# Patient Record
Sex: MALE | Race: WHITE | NOT HISPANIC OR LATINO | Employment: FULL TIME | URBAN - METROPOLITAN AREA
[De-identification: names, ages, dates, MRNs, and addresses within clinical notes are randomized per-mention and may not be internally consistent; named-entity substitution may affect disease eponyms.]

---

## 2023-08-25 PROBLEM — R06.83 SNORING: Status: ACTIVE | Noted: 2023-08-25

## 2023-08-25 PROBLEM — J34.89 NASAL OBSTRUCTION: Status: ACTIVE | Noted: 2023-08-25

## 2023-08-25 PROBLEM — J34.89 LESION OR MASS OF PARANASAL SINUSES: Status: ACTIVE | Noted: 2023-08-25

## 2023-08-25 PROBLEM — E66.01 MORBID OBESITY (MULTI): Status: ACTIVE | Noted: 2023-08-25

## 2023-08-25 PROBLEM — G47.33 OBSTRUCTIVE SLEEP APNEA, ADULT: Status: ACTIVE | Noted: 2023-08-25

## 2023-08-25 PROBLEM — J32.0 CHRONIC RIGHT MAXILLARY SINUSITIS: Status: ACTIVE | Noted: 2023-08-25

## 2023-08-25 PROBLEM — R05.8 PRODUCTIVE COUGH: Status: ACTIVE | Noted: 2023-08-25

## 2023-08-25 PROBLEM — J34.89 NASAL CRUSTING: Status: ACTIVE | Noted: 2023-08-25

## 2023-08-25 PROBLEM — R05.3 COUGH, PERSISTENT: Status: ACTIVE | Noted: 2023-08-25

## 2023-08-25 PROBLEM — J34.2 NASAL SEPTAL DEVIATION: Status: ACTIVE | Noted: 2023-08-25

## 2023-08-25 PROBLEM — Z04.9 CONDITION NOT FOUND: Status: ACTIVE | Noted: 2023-08-25

## 2023-08-25 PROBLEM — J34.3 HYPERTROPHY OF BOTH INFERIOR NASAL TURBINATES: Status: ACTIVE | Noted: 2023-08-25

## 2023-08-25 PROBLEM — J31.0 CHRONIC RHINITIS: Status: ACTIVE | Noted: 2023-08-25

## 2023-08-25 RX ORDER — PHENYLPROPANOLAMINE/CLEMASTINE 75-1.34MG
TABLET, EXTENDED RELEASE ORAL
COMMUNITY

## 2023-08-25 RX ORDER — SILDENAFIL 100 MG/1
100 TABLET, FILM COATED ORAL DAILY PRN
COMMUNITY
Start: 2023-02-23

## 2023-10-03 ENCOUNTER — APPOINTMENT (OUTPATIENT)
Dept: NUTRITION | Facility: HOSPITAL | Age: 58
End: 2023-10-03
Payer: COMMERCIAL

## 2023-10-31 ENCOUNTER — OFFICE VISIT (OUTPATIENT)
Dept: PRIMARY CARE | Facility: CLINIC | Age: 58
End: 2023-10-31
Payer: COMMERCIAL

## 2023-10-31 VITALS — DIASTOLIC BLOOD PRESSURE: 92 MMHG | SYSTOLIC BLOOD PRESSURE: 126 MMHG | BODY MASS INDEX: 43.81 KG/M2 | WEIGHT: 315 LBS

## 2023-10-31 DIAGNOSIS — Z00.00 HEALTHCARE MAINTENANCE: Primary | ICD-10-CM

## 2023-10-31 DIAGNOSIS — I10 HYPERTENSION, UNSPECIFIED TYPE: ICD-10-CM

## 2023-10-31 PROCEDURE — 1036F TOBACCO NON-USER: CPT | Performed by: STUDENT IN AN ORGANIZED HEALTH CARE EDUCATION/TRAINING PROGRAM

## 2023-10-31 PROCEDURE — 3080F DIAST BP >= 90 MM HG: CPT | Performed by: STUDENT IN AN ORGANIZED HEALTH CARE EDUCATION/TRAINING PROGRAM

## 2023-10-31 PROCEDURE — 3074F SYST BP LT 130 MM HG: CPT | Performed by: STUDENT IN AN ORGANIZED HEALTH CARE EDUCATION/TRAINING PROGRAM

## 2023-10-31 PROCEDURE — 3008F BODY MASS INDEX DOCD: CPT | Performed by: STUDENT IN AN ORGANIZED HEALTH CARE EDUCATION/TRAINING PROGRAM

## 2023-10-31 PROCEDURE — 99386 PREV VISIT NEW AGE 40-64: CPT | Performed by: STUDENT IN AN ORGANIZED HEALTH CARE EDUCATION/TRAINING PROGRAM

## 2023-10-31 RX ORDER — VITAMIN A 3000 MCG
CAPSULE ORAL
COMMUNITY
Start: 2022-11-29

## 2023-10-31 RX ORDER — CALCIUM CARBONATE/VITAMIN D2 250 MG-125
TABLET ORAL
COMMUNITY
Start: 2022-11-29 | End: 2024-01-31

## 2023-10-31 RX ORDER — ENALAPRIL MALEATE 10 MG/1
10 TABLET ORAL
COMMUNITY
Start: 2023-09-25 | End: 2023-10-31 | Stop reason: ALTCHOICE

## 2023-10-31 RX ORDER — TRIAMCINOLONE ACETONIDE 55 UG/1
SPRAY, METERED NASAL
COMMUNITY
Start: 2022-12-06

## 2023-10-31 RX ORDER — TRAMADOL HYDROCHLORIDE 50 MG/1
TABLET ORAL
COMMUNITY
Start: 2022-11-29 | End: 2023-10-31 | Stop reason: ALTCHOICE

## 2023-10-31 RX ORDER — ENALAPRIL MALEATE 20 MG/1
20 TABLET ORAL DAILY
Qty: 30 TABLET | Refills: 11 | Status: SHIPPED | OUTPATIENT
Start: 2023-10-31 | End: 2024-01-31 | Stop reason: SDUPTHER

## 2023-10-31 RX ORDER — ONDANSETRON 4 MG/1
TABLET, ORALLY DISINTEGRATING ORAL
COMMUNITY
Start: 2022-11-29 | End: 2023-10-31 | Stop reason: ALTCHOICE

## 2023-10-31 NOTE — PROGRESS NOTES
Establish as a new patient and concerned about high blood pressure and weight    Subjective   Patient ID: Luther Zimmerman is a 57 y.o. male who presents for Establish Care, Hypertension, and conerned about weight.    HPI     Presents to [facility] care    Past medical history: Hypertension, erectile dysfunction, SULMA on CPAP  Past surgical history: [place] teeth, rotator cuff, nasal surgery  No known drug allergies  Social history: Heavy , , 3 kids aged 15 and 28  Family history: Mother with breast cancer    Knee injury at work about 1 year ago, has follow-up with orthopedics and had the knee drained and has received steroid injection.  Still has significantly limited his activities.    Would like to talk about weight loss strategies.  Had seen a nutritionist previously    Diastolic blood pressure at home is always in the 90s to 100    Review of Systems    8 point review of systems is otherwise negative unless mentioned on HPI      Objective   BP (!) 126/92   Wt 147 kg (323 lb)   BMI 43.81 kg/m²     Physical Exam    General: No acute distress  HEENT: EOMI  CV: Regular rate and rhythm, normal S1 and S2, no murmurs  Pulm: Clear to auscultation bilaterally, no wheezings, rales or rhonchi  Abd: Nondistended  MSK: 5/5 strength in all extremities  Skin: No rashes   Lymphatic: No lymphadenopathy      Assessment/Plan       BMI 43  -Discussed different weight loss strategies.  Encouraged activities that would allow with his right knee injury, discussed recumbent bike, elliptical or rowing.  When has been in the gym and warm and focusing on strengthening exercises and heavy weights  -Discussed different dietary modifications including focusing on low carbohydrate diet  -Start Ozempic 0.25 mg weekly, discussed common side effects    Hypertension  -Increase enalapril from 10 mg daily to 20 mg daily    SULMA  -On CPAP    Erectile dysfunction  -Viagra as needed    Healthcare maintenance  -Routine labs    RTC 2  months    This note was dictated by speech recognition. Minor errors in transcription may be present.

## 2023-11-02 ENCOUNTER — LAB (OUTPATIENT)
Dept: LAB | Facility: LAB | Age: 58
End: 2023-11-02
Payer: COMMERCIAL

## 2023-11-02 ENCOUNTER — TELEPHONE (OUTPATIENT)
Dept: PRIMARY CARE | Facility: CLINIC | Age: 58
End: 2023-11-02

## 2023-11-02 DIAGNOSIS — Z00.00 HEALTHCARE MAINTENANCE: ICD-10-CM

## 2023-11-02 LAB
ALBUMIN SERPL BCP-MCNC: 4.6 G/DL (ref 3.4–5)
ALP SERPL-CCNC: 62 U/L (ref 33–120)
ALT SERPL W P-5'-P-CCNC: 22 U/L (ref 10–52)
ANION GAP SERPL CALC-SCNC: 16 MMOL/L (ref 10–20)
AST SERPL W P-5'-P-CCNC: 15 U/L (ref 9–39)
BILIRUB SERPL-MCNC: 0.5 MG/DL (ref 0–1.2)
BUN SERPL-MCNC: 16 MG/DL (ref 6–23)
CALCIUM SERPL-MCNC: 9.5 MG/DL (ref 8.6–10.6)
CHLORIDE SERPL-SCNC: 102 MMOL/L (ref 98–107)
CHOLEST SERPL-MCNC: 164 MG/DL (ref 0–199)
CHOLESTEROL/HDL RATIO: 3.3
CO2 SERPL-SCNC: 26 MMOL/L (ref 21–32)
CREAT SERPL-MCNC: 1.05 MG/DL (ref 0.5–1.3)
ERYTHROCYTE [DISTWIDTH] IN BLOOD BY AUTOMATED COUNT: 14.2 % (ref 11.5–14.5)
EST. AVERAGE GLUCOSE BLD GHB EST-MCNC: 114 MG/DL
GFR SERPL CREATININE-BSD FRML MDRD: 83 ML/MIN/1.73M*2
GLUCOSE SERPL-MCNC: 88 MG/DL (ref 74–99)
HBA1C MFR BLD: 5.6 %
HCT VFR BLD AUTO: 50.1 % (ref 41–52)
HDLC SERPL-MCNC: 49.1 MG/DL
HGB BLD-MCNC: 16 G/DL (ref 13.5–17.5)
INSULIN P FAST SERPL-ACNC: 15 UIU/ML (ref 3–25)
LDLC SERPL CALC-MCNC: 103 MG/DL
MCH RBC QN AUTO: 29.9 PG (ref 26–34)
MCHC RBC AUTO-ENTMCNC: 31.9 G/DL (ref 32–36)
MCV RBC AUTO: 94 FL (ref 80–100)
NON HDL CHOLESTEROL: 115 MG/DL (ref 0–149)
NRBC BLD-RTO: 0 /100 WBCS (ref 0–0)
PLATELET # BLD AUTO: 354 X10*3/UL (ref 150–450)
POTASSIUM SERPL-SCNC: 4.5 MMOL/L (ref 3.5–5.3)
PROT SERPL-MCNC: 7.4 G/DL (ref 6.4–8.2)
PSA SERPL-MCNC: 2.46 NG/ML
RBC # BLD AUTO: 5.35 X10*6/UL (ref 4.5–5.9)
SODIUM SERPL-SCNC: 139 MMOL/L (ref 136–145)
TRIGL SERPL-MCNC: 62 MG/DL (ref 0–149)
TSH SERPL-ACNC: 0.48 MIU/L (ref 0.44–3.98)
VLDL: 12 MG/DL (ref 0–40)
WBC # BLD AUTO: 10.1 X10*3/UL (ref 4.4–11.3)

## 2023-11-02 PROCEDURE — 83525 ASSAY OF INSULIN: CPT

## 2023-11-02 PROCEDURE — 84443 ASSAY THYROID STIM HORMONE: CPT

## 2023-11-02 PROCEDURE — 80053 COMPREHEN METABOLIC PANEL: CPT

## 2023-11-02 PROCEDURE — 80061 LIPID PANEL: CPT

## 2023-11-02 PROCEDURE — 84153 ASSAY OF PSA TOTAL: CPT

## 2023-11-02 PROCEDURE — 85027 COMPLETE CBC AUTOMATED: CPT

## 2023-11-02 PROCEDURE — 83036 HEMOGLOBIN GLYCOSYLATED A1C: CPT

## 2023-11-02 PROCEDURE — 36415 COLL VENOUS BLD VENIPUNCTURE: CPT

## 2023-11-02 NOTE — TELEPHONE ENCOUNTER
Patient was turned down for ozempic wants to know if you can prescribe wegovy.  I informed him it was out of stock until January.  Patient is getting lab work today and was hoping for results by tomorrow, Please call

## 2023-11-03 RX ORDER — SEMAGLUTIDE 0.25 MG/.5ML
0.25 INJECTION, SOLUTION SUBCUTANEOUS
Qty: 2 ML | Refills: 1 | Status: SHIPPED | OUTPATIENT
Start: 2023-11-03 | End: 2023-12-23

## 2024-01-05 ENCOUNTER — APPOINTMENT (OUTPATIENT)
Dept: PRIMARY CARE | Facility: CLINIC | Age: 59
End: 2024-01-05
Payer: COMMERCIAL

## 2024-01-29 ASSESSMENT — ENCOUNTER SYMPTOMS
PND: 0
SWEATS: 0
HEADACHES: 1
SHORTNESS OF BREATH: 1
PALPITATIONS: 1
HYPERTENSION: 1
BLURRED VISION: 0
NECK PAIN: 0
ORTHOPNEA: 0

## 2024-01-31 ENCOUNTER — TELEPHONE (OUTPATIENT)
Dept: PRIMARY CARE | Facility: CLINIC | Age: 59
End: 2024-01-31

## 2024-01-31 ENCOUNTER — OFFICE VISIT (OUTPATIENT)
Dept: PRIMARY CARE | Facility: CLINIC | Age: 59
End: 2024-01-31
Payer: COMMERCIAL

## 2024-01-31 VITALS — BODY MASS INDEX: 43.67 KG/M2 | WEIGHT: 315 LBS | DIASTOLIC BLOOD PRESSURE: 100 MMHG | SYSTOLIC BLOOD PRESSURE: 148 MMHG

## 2024-01-31 DIAGNOSIS — Z00.00 HEALTHCARE MAINTENANCE: Primary | ICD-10-CM

## 2024-01-31 DIAGNOSIS — I10 HYPERTENSION, UNSPECIFIED TYPE: ICD-10-CM

## 2024-01-31 PROBLEM — R05.3 COUGH, PERSISTENT: Status: RESOLVED | Noted: 2023-08-25 | Resolved: 2024-01-31

## 2024-01-31 PROBLEM — R41.89 COGNITIVE IMPAIRMENT: Status: ACTIVE | Noted: 2019-06-04

## 2024-01-31 PROBLEM — S83.92XA SPRAIN OF LEFT KNEE: Status: ACTIVE | Noted: 2023-07-11

## 2024-01-31 PROBLEM — S61.210A LACERATION OF RIGHT INDEX FINGER: Status: ACTIVE | Noted: 2023-02-28

## 2024-01-31 PROBLEM — S61.212A LACERATION OF RIGHT MIDDLE FINGER: Status: ACTIVE | Noted: 2023-02-28
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
## 2024-01-31 PROCEDURE — 99213 OFFICE O/P EST LOW 20 MIN: CPT | Performed by: STUDENT IN AN ORGANIZED HEALTH CARE EDUCATION/TRAINING PROGRAM

## 2024-01-31 PROCEDURE — 3008F BODY MASS INDEX DOCD: CPT | Performed by: STUDENT IN AN ORGANIZED HEALTH CARE EDUCATION/TRAINING PROGRAM

## 2024-01-31 PROCEDURE — 3080F DIAST BP >= 90 MM HG: CPT | Performed by: STUDENT IN AN ORGANIZED HEALTH CARE EDUCATION/TRAINING PROGRAM

## 2024-01-31 PROCEDURE — 1036F TOBACCO NON-USER: CPT | Performed by: STUDENT IN AN ORGANIZED HEALTH CARE EDUCATION/TRAINING PROGRAM

## 2024-01-31 PROCEDURE — 3077F SYST BP >= 140 MM HG: CPT | Performed by: STUDENT IN AN ORGANIZED HEALTH CARE EDUCATION/TRAINING PROGRAM

## 2024-01-31 RX ORDER — ENALAPRIL MALEATE 20 MG/1
40 TABLET ORAL DAILY
Qty: 180 TABLET | Refills: 1 | Status: SHIPPED | OUTPATIENT
Start: 2024-01-31 | End: 2024-04-30 | Stop reason: SDUPTHER

## 2024-01-31 RX ORDER — SEMAGLUTIDE 0.5 MG/.5ML
0.5 INJECTION, SOLUTION SUBCUTANEOUS
Qty: 2 ML | Refills: 5 | Status: SHIPPED | OUTPATIENT
Start: 2024-01-31 | End: 2024-04-30 | Stop reason: ALTCHOICE

## 2024-01-31 ASSESSMENT — ENCOUNTER SYMPTOMS
ORTHOPNEA: 0
PALPITATIONS: 1
HYPERTENSION: 1
SWEATS: 0
SHORTNESS OF BREATH: 1
NECK PAIN: 0
HEADACHES: 1
BLURRED VISION: 0
PND: 0

## 2024-01-31 NOTE — PROGRESS NOTES
Follow up and bp is still high and discuss testosterone level and thyroid level and discuss ozempic med  Answers submitted by the patient for this visit:  High Blood Pressure Questionnaire (Submitted on 1/29/2024)  Chief Complaint: Hypertension  Chronicity: recurrent  Onset: more than 1 month ago  Progression since onset: unchanged  Condition status: resistant  anxiety: Yes  blurred vision: No  chest pain: Yes  headaches: Yes  malaise/fatigue: Yes  neck pain: No  orthopnea: No  palpitations: Yes  peripheral edema: No  PND: No  shortness of breath: Yes  sweats: No  Agents associated with hypertension: decongestants  CAD risks: family history, obesity, stress  Compliance problems: diet    Subjective   Patient ID: Luther Zimmerman is a 58 y.o. male who presents for Follow-up, bp is still high, check testosterone and thyroid issue, and discuss ozempic medication.    Hypertension  This is a recurrent problem. The current episode started more than 1 month ago. The problem is unchanged. The problem is resistant. Associated symptoms include anxiety, chest pain, headaches, malaise/fatigue, palpitations and shortness of breath. Pertinent negatives include no blurred vision, neck pain, orthopnea, peripheral edema, PND or sweats. Agents associated with hypertension include decongestants. Risk factors for coronary artery disease include family history, obesity and stress. Compliance problems include diet.         Presents for follow-up.  At previous visit had prescribed Wegovy 0.25 mg weekly.  Had been on backorder from pharmacy and had been unable to fill.  Does report that his insurance will cover this for elevated BMI but would not cover Ozempic in the absence of diabetes.  Blood pressure has been a little bit elevated.  Would like to have testosterone levels checked.  Has been doing more weightlifting and cardio at the gym.  Cardio can be limited by right knee pain.    Review of Systems   Constitutional:  Positive for  malaise/fatigue.   Eyes:  Negative for blurred vision.   Respiratory:  Positive for shortness of breath.    Cardiovascular:  Positive for chest pain and palpitations. Negative for orthopnea and PND.   Musculoskeletal:  Negative for neck pain.   Neurological:  Positive for headaches.       8 point review of systems is otherwise negative unless mentioned on HPI      Objective   BP (!) 148/100   Wt (!) 150 kg (331 lb)   BMI 43.67 kg/m²     Physical Exam    General: No acute distress  HEENT: EOMI  CV: Regular rate and rhythm, normal S1 and S2, no murmurs  Pulm: Clear to auscultation bilaterally, no wheezings, rales or rhonchi  Abd: Nondistended  MSK: 5/5 strength in all extremities  Skin: No rashes   Lymphatic: No lymphadenopathy      Assessment/Plan       BMI 43  -Discussed different weight loss strategies.  Encouraged activities that would allow with his right knee injury, discussed recumbent bike, elliptical or rowing.  When has been in the gym and warm and focusing on strengthening exercises and heavy weights  -Discussed different dietary modifications including focusing on low carbohydrate diet  -Continue work on cutting down and limiting pop, discussed drinking protein drinks instead.  -Reports Wegovy is covered for weight loss but that has been on backorder in the pharmacy, will try to prescribe the 0.5 mg dose in case could possibly be in stock     Hypertension  -Increase enalapril from 20 mg daily to 40 mg daily     SULMA  -On CPAP     Erectile dysfunction  -Viagra as needed     Healthcare maintenance  -Routine labs 11/2023     RTC 3 months     This note was dictated by speech recognition. Minor errors in transcription may be present.

## 2024-01-31 NOTE — PROGRESS NOTES
Answers submitted by the patient for this visit:  High Blood Pressure Questionnaire (Submitted on 1/29/2024)  Chief Complaint: Hypertension  Chronicity: recurrent  Onset: more than 1 month ago  Progression since onset: unchanged  Condition status: resistant  anxiety: Yes  blurred vision: No  chest pain: Yes  headaches: Yes  malaise/fatigue: Yes  neck pain: No  orthopnea: No  palpitations: Yes  peripheral edema: No  PND: No  shortness of breath: Yes  sweats: No  Agents associated with hypertension: decongestants  CAD risks: family history, obesity, stress  Compliance problems: diet

## 2024-02-01 RX ORDER — SEMAGLUTIDE 0.5 MG/.5ML
0.5 INJECTION, SOLUTION SUBCUTANEOUS
Refills: 5 | OUTPATIENT
Start: 2024-02-01 | End: 2027-07-09

## 2024-02-07 RX ORDER — SEMAGLUTIDE 1 MG/.5ML
1 INJECTION, SOLUTION SUBCUTANEOUS
Qty: 2 ML | Refills: 5 | Status: SHIPPED | OUTPATIENT
Start: 2024-02-07 | End: 2024-02-07 | Stop reason: SDUPTHER

## 2024-02-07 RX ORDER — SEMAGLUTIDE 1 MG/.5ML
1 INJECTION, SOLUTION SUBCUTANEOUS
Qty: 2 ML | Refills: 5 | Status: SHIPPED | OUTPATIENT
Start: 2024-02-07 | End: 2024-08-05

## 2024-02-19 ENCOUNTER — LAB (OUTPATIENT)
Dept: LAB | Facility: LAB | Age: 59
End: 2024-02-19
Payer: COMMERCIAL

## 2024-02-19 DIAGNOSIS — Z00.00 HEALTHCARE MAINTENANCE: ICD-10-CM

## 2024-02-19 LAB — TESTOST SERPL-MCNC: 521 NG/DL (ref 240–1000)

## 2024-02-19 PROCEDURE — 84403 ASSAY OF TOTAL TESTOSTERONE: CPT

## 2024-02-19 PROCEDURE — 36415 COLL VENOUS BLD VENIPUNCTURE: CPT

## 2024-02-20 ENCOUNTER — TELEPHONE (OUTPATIENT)
Dept: PRIMARY CARE | Facility: CLINIC | Age: 59
End: 2024-02-20
Payer: COMMERCIAL

## 2024-02-20 NOTE — TELEPHONE ENCOUNTER
----- Message from Conor Holbrook MD sent at 2/20/2024  9:40 AM EST -----  Please let Luther know that his testosterone was 521 which is in the middle of the normal range, thanks

## 2024-04-29 PROBLEM — I10 HYPERTENSION: Status: ACTIVE | Noted: 2023-10-31

## 2024-04-29 PROBLEM — Z86.39 HISTORY OF ENDOCRINE DISORDER: Status: ACTIVE | Noted: 2024-04-29

## 2024-04-29 PROBLEM — Z86.69 HISTORY OF BELL'S PALSY: Status: ACTIVE | Noted: 2024-04-29

## 2024-04-29 PROBLEM — J06.9 ACUTE UPPER RESPIRATORY INFECTION: Status: ACTIVE | Noted: 2024-04-29

## 2024-04-29 PROBLEM — K12.2 LUDWIG'S ANGINA: Status: ACTIVE | Noted: 2024-04-29

## 2024-04-29 PROBLEM — J34.2 DEVIATED NASAL SEPTUM: Status: ACTIVE | Noted: 2023-08-25

## 2024-04-29 PROBLEM — S60.012A CONTUSION OF LEFT THUMB: Status: ACTIVE | Noted: 2024-04-29

## 2024-04-29 PROBLEM — J32.9 CHRONIC SINUSITIS: Status: ACTIVE | Noted: 2023-08-25

## 2024-04-29 PROBLEM — R41.89 IMPAIRED COGNITION: Status: ACTIVE | Noted: 2019-06-04

## 2024-04-29 PROBLEM — L29.9 ITCHING OF EAR: Status: ACTIVE | Noted: 2024-04-29

## 2024-04-29 PROBLEM — J34.3 HYPERTROPHY OF NASAL TURBINATES: Status: ACTIVE | Noted: 2023-08-25

## 2024-04-30 ENCOUNTER — OFFICE VISIT (OUTPATIENT)
Dept: PRIMARY CARE | Facility: CLINIC | Age: 59
End: 2024-04-30
Payer: COMMERCIAL

## 2024-04-30 VITALS — DIASTOLIC BLOOD PRESSURE: 86 MMHG | BODY MASS INDEX: 41.56 KG/M2 | SYSTOLIC BLOOD PRESSURE: 150 MMHG | WEIGHT: 315 LBS

## 2024-04-30 DIAGNOSIS — I10 HYPERTENSION, UNSPECIFIED TYPE: ICD-10-CM

## 2024-04-30 PROCEDURE — 3008F BODY MASS INDEX DOCD: CPT | Performed by: STUDENT IN AN ORGANIZED HEALTH CARE EDUCATION/TRAINING PROGRAM

## 2024-04-30 PROCEDURE — 99214 OFFICE O/P EST MOD 30 MIN: CPT | Performed by: STUDENT IN AN ORGANIZED HEALTH CARE EDUCATION/TRAINING PROGRAM

## 2024-04-30 PROCEDURE — 3077F SYST BP >= 140 MM HG: CPT | Performed by: STUDENT IN AN ORGANIZED HEALTH CARE EDUCATION/TRAINING PROGRAM

## 2024-04-30 PROCEDURE — 3079F DIAST BP 80-89 MM HG: CPT | Performed by: STUDENT IN AN ORGANIZED HEALTH CARE EDUCATION/TRAINING PROGRAM

## 2024-04-30 RX ORDER — SEMAGLUTIDE 1.7 MG/.75ML
1.7 INJECTION, SOLUTION SUBCUTANEOUS
Qty: 9 ML | Refills: 1 | Status: SHIPPED | OUTPATIENT
Start: 2024-05-05 | End: 2024-05-27

## 2024-04-30 RX ORDER — ENALAPRIL MALEATE 20 MG/1
40 TABLET ORAL DAILY
Qty: 180 TABLET | Refills: 1 | Status: SHIPPED | OUTPATIENT
Start: 2024-04-30 | End: 2024-10-27

## 2024-04-30 NOTE — PROGRESS NOTES
Follow up and blood pressure check    Subjective   Patient ID: Luther Zimmerman is a 58 y.o. male who presents for Follow-up and Med Refill.    HPI     Followup. Has been monitoring blood pressure. Averaging in low 130s. Lost 16lbs since last visit. Got engaged and [place] baseball game since last visit.     Review of Systems    8 point review of systems is otherwise negative unless mentioned on HPI      Objective   /86   Wt 143 kg (315 lb)   BMI 41.56 kg/m²     Physical Exam    General: No acute distress  HEENT: EOMI  CV: Regular rate and rhythm, normal S1 and S2, no murmurs  Pulm: Clear to auscultation bilaterally, no wheezings, rales or rhonchi  Abd: Nondistended  MSK: 5/5 strength in all extremities  Skin: No rashes   Lymphatic: No lymphadenopathy      Assessment/Plan       BMI 43  -Discussed different weight loss strategies.  Encouraged activities that would allow with his right knee injury, discussed recumbent bike, elliptical or rowing.  When has been in the gym and warm and focusing on strengthening exercises and heavy weights  -Discussed different dietary modifications including focusing on low carbohydrate diet  -Continue work on cutting down and limiting pop, discussed drinking protein drinks instead.  -Lost 16lbs since last visit  -Increase Wegovy from 1mg weekly to 1.7mg weekly     Hypertension  -Continue enalapril 40mg daily     SULMA  -On CPAP     Erectile dysfunction  -Viagra as needed     Healthcare maintenance  -Routine labs 11/2023     RTC 3 months     This note was dictated by speech recognition. Minor errors in transcription may be present.

## 2024-07-19 ENCOUNTER — APPOINTMENT (OUTPATIENT)
Dept: PRIMARY CARE | Facility: CLINIC | Age: 59
End: 2024-07-19
Payer: COMMERCIAL

## 2024-07-19 VITALS
HEART RATE: 90 BPM | RESPIRATION RATE: 20 BRPM | SYSTOLIC BLOOD PRESSURE: 136 MMHG | BODY MASS INDEX: 40.77 KG/M2 | DIASTOLIC BLOOD PRESSURE: 93 MMHG | WEIGHT: 309 LBS | OXYGEN SATURATION: 95 %

## 2024-07-19 DIAGNOSIS — I10 HYPERTENSION, UNSPECIFIED TYPE: ICD-10-CM

## 2024-07-19 RX ORDER — SEMAGLUTIDE 2.4 MG/.75ML
2.4 INJECTION, SOLUTION SUBCUTANEOUS
Qty: 3 ML | Refills: 2 | Status: SHIPPED | OUTPATIENT
Start: 2024-07-21 | End: 2024-10-07

## 2024-07-19 ASSESSMENT — PATIENT HEALTH QUESTIONNAIRE - PHQ9
1. LITTLE INTEREST OR PLEASURE IN DOING THINGS: NOT AT ALL
SUM OF ALL RESPONSES TO PHQ9 QUESTIONS 1 AND 2: 0
2. FEELING DOWN, DEPRESSED OR HOPELESS: NOT AT ALL

## 2024-07-19 ASSESSMENT — COLUMBIA-SUICIDE SEVERITY RATING SCALE - C-SSRS: 1. IN THE PAST MONTH, HAVE YOU WISHED YOU WERE DEAD OR WISHED YOU COULD GO TO SLEEP AND NOT WAKE UP?: NO

## 2024-07-19 NOTE — PROGRESS NOTES
Subjective   Patient ID: Luther Zimmerman is a 58 y.o. male who presents for Follow-up.    HPI     Presents for followup. Has been working lots of hours. Notes constipation but not changed since being on Wegovy    Review of Systems    8 point review of systems is otherwise negative unless mentioned on HPI      Objective   BP (!) 136/93 (BP Location: Left arm, Patient Position: Sitting, BP Cuff Size: Adult)   Pulse 90   Resp 20   Wt 140 kg (309 lb)   SpO2 95%   BMI 40.77 kg/m²     Physical Exam    General: No acute distress  HEENT: EOMI  CV: Regular rate and rhythm, normal S1 and S2, no murmurs  Pulm: Clear to auscultation bilaterally, no wheezings, rales or rhonchi  Abd: Nondistended  MSK: 5/5 strength in all extremities  Skin: No rashes   Lymphatic: No lymphadenopathy      Assessment/Plan       BMI 43  -Discussed different weight loss strategies.  Encouraged activities that would allow with his right knee injury, discussed recumbent bike, elliptical or rowing.  When has been in the gym and warm and focusing on strengthening exercises and heavy weights  -Discussed different dietary modifications including focusing on low carbohydrate diet  -Continue work on cutting down and limiting pop, discussed drinking protein drinks instead.  -Increase Wegovy from 1.7mg weekly to 2.4mg weekly  -Mentions may need new prior authorization and number is [id number]       Hypertension  -Continue enalapril 40mg daily     SULMA  -On CPAP     Erectile dysfunction  -Viagra as needed     Healthcare maintenance  -Routine labs 11/2023     RTC 3 months     This note was dictated by speech recognition. Minor errors in transcription may be present.

## 2024-07-26 RX ORDER — SEMAGLUTIDE 2.4 MG/.75ML
INJECTION, SOLUTION SUBCUTANEOUS
Qty: 3 ML | Refills: 2 | Status: SHIPPED | OUTPATIENT
Start: 2024-07-26

## 2024-08-29 ENCOUNTER — APPOINTMENT (OUTPATIENT)
Dept: OTOLARYNGOLOGY | Facility: CLINIC | Age: 59
End: 2024-08-29
Payer: COMMERCIAL

## 2024-09-04 ENCOUNTER — OFFICE VISIT (OUTPATIENT)
Dept: OTOLARYNGOLOGY | Facility: CLINIC | Age: 59
End: 2024-09-04
Payer: COMMERCIAL

## 2024-09-04 VITALS
HEIGHT: 73 IN | TEMPERATURE: 98.2 F | BODY MASS INDEX: 39.89 KG/M2 | DIASTOLIC BLOOD PRESSURE: 91 MMHG | SYSTOLIC BLOOD PRESSURE: 137 MMHG | WEIGHT: 301 LBS | HEART RATE: 88 BPM

## 2024-09-04 DIAGNOSIS — J31.0 CHRONIC RHINITIS: ICD-10-CM

## 2024-09-04 DIAGNOSIS — H60.8X3 CHRONIC ECZEMATOUS OTITIS EXTERNA OF BOTH EARS: Primary | ICD-10-CM

## 2024-09-04 PROCEDURE — 1036F TOBACCO NON-USER: CPT | Performed by: STUDENT IN AN ORGANIZED HEALTH CARE EDUCATION/TRAINING PROGRAM

## 2024-09-04 PROCEDURE — 3080F DIAST BP >= 90 MM HG: CPT | Performed by: STUDENT IN AN ORGANIZED HEALTH CARE EDUCATION/TRAINING PROGRAM

## 2024-09-04 PROCEDURE — 99213 OFFICE O/P EST LOW 20 MIN: CPT | Performed by: STUDENT IN AN ORGANIZED HEALTH CARE EDUCATION/TRAINING PROGRAM

## 2024-09-04 PROCEDURE — 3008F BODY MASS INDEX DOCD: CPT | Performed by: STUDENT IN AN ORGANIZED HEALTH CARE EDUCATION/TRAINING PROGRAM

## 2024-09-04 PROCEDURE — 3075F SYST BP GE 130 - 139MM HG: CPT | Performed by: STUDENT IN AN ORGANIZED HEALTH CARE EDUCATION/TRAINING PROGRAM

## 2024-09-04 RX ORDER — TRIAMCINOLONE ACETONIDE 0.25 MG/G
OINTMENT TOPICAL 2 TIMES DAILY PRN
Qty: 15 G | Refills: 3 | Status: SHIPPED | OUTPATIENT
Start: 2024-09-04

## 2024-09-04 SDOH — ECONOMIC STABILITY: FOOD INSECURITY: WITHIN THE PAST 12 MONTHS, THE FOOD YOU BOUGHT JUST DIDN'T LAST AND YOU DIDN'T HAVE MONEY TO GET MORE.: NEVER TRUE

## 2024-09-04 SDOH — ECONOMIC STABILITY: FOOD INSECURITY: WITHIN THE PAST 12 MONTHS, YOU WORRIED THAT YOUR FOOD WOULD RUN OUT BEFORE YOU GOT MONEY TO BUY MORE.: NEVER TRUE

## 2024-09-04 ASSESSMENT — PATIENT HEALTH QUESTIONNAIRE - PHQ9
SUM OF ALL RESPONSES TO PHQ9 QUESTIONS 1 AND 2: 0
2. FEELING DOWN, DEPRESSED OR HOPELESS: NOT AT ALL
1. LITTLE INTEREST OR PLEASURE IN DOING THINGS: NOT AT ALL

## 2024-09-04 ASSESSMENT — LIFESTYLE VARIABLES
HOW MANY STANDARD DRINKS CONTAINING ALCOHOL DO YOU HAVE ON A TYPICAL DAY: 1 OR 2
SKIP TO QUESTIONS 9-10: 1
AUDIT-C TOTAL SCORE: 1
HOW OFTEN DO YOU HAVE A DRINK CONTAINING ALCOHOL: MONTHLY OR LESS
HOW OFTEN DO YOU HAVE SIX OR MORE DRINKS ON ONE OCCASION: NEVER

## 2024-09-04 ASSESSMENT — COLUMBIA-SUICIDE SEVERITY RATING SCALE - C-SSRS
2. HAVE YOU ACTUALLY HAD ANY THOUGHTS OF KILLING YOURSELF?: NO
6. HAVE YOU EVER DONE ANYTHING, STARTED TO DO ANYTHING, OR PREPARED TO DO ANYTHING TO END YOUR LIFE?: NO
1. IN THE PAST MONTH, HAVE YOU WISHED YOU WERE DEAD OR WISHED YOU COULD GO TO SLEEP AND NOT WAKE UP?: NO

## 2024-09-04 ASSESSMENT — PAIN SCALES - GENERAL: PAINLEVEL: 0-NO PAIN

## 2024-09-04 ASSESSMENT — ENCOUNTER SYMPTOMS
DEPRESSION: 0
LOSS OF SENSATION IN FEET: 0
OCCASIONAL FEELINGS OF UNSTEADINESS: 0

## 2024-09-04 NOTE — PATIENT INSTRUCTIONS
Use a pea-sized amount of topical steroid ointment twice a day inside the ears and ear canals for 14 days. Then reassess symptoms. You can then use this as needed for ear itching.

## 2024-09-04 NOTE — PROGRESS NOTES
"SUBJECTIVE  Patient ID: Luther Zimmerman is a 58 y.o. male who presents for Follow-up (Nasal surgery).    History: 56 year-old male referred by one of my other patients for evaluation of nasal breathing.     The patient notes a history of sleep apnea; he notes that he has had to slowly increase his airway pressures. If he falls asleep without it he notes a sore throat and dizziness.     He reports nasal airway obstruction which has bothered him throughout his life. Left side seems worse. He notes a history of septal deviation. Has had some nasal drainage lately. Will get occasional maxillary pressure/pain. Sense of smell is okay. Found some benefit from an unknown antihistamine. Has found some relief with Allegra-D. Has used Flonase in the past without success. Has not tried irrigations. He reports history of environmental allergies; no formal testing.     Does have some prior trauma to nose; not sure if it broke or not. No history of nasal/sinus surgery.     Interestingly he has previously had an episode of Jose's Angina (was intubated but did not require tracheotomy) and left-sided Bell's palsy (now recovered).     Update 8/31/2023:  Follow-up for chronic rhinitis/CRS. Patient reports that his nasal breathing is much improved from surgery. No nasal concerns today.  He reports that he has still gained weight despite efforts to lose weight. Want to work on this.     Update 9/4/2024:  Follow-up for chronic rhinitis/CRS. He reports that the nasal breathign is \"pretty good.\" He is bothered again by ear itching. Not currently using any nasal sprays.  Not using anything in the ears.    OBJECTIVE  Physical Exam  CONSTITUTIONAL: Well appearing male who appears stated age.  PSYCHIATRIC: Alert, appropriate mood and affect.  RESPIRATORY: Normal inspiration and expiration and chest wall expansion; no use of accessory muscles to breathe.  VOICE: Clear speech without hoarseness. No stridor nor stertor.  HEAD, FACE, AND SKIN: " Symmetric facial feature.  EARS: Dry skin at the conchae bilaterally. No ceruminous buildup. Tms intact.  NOSE: Nasal dorsum was midline.  Good tip support.  On anterior rhinoscopy the nasal septum is nicely midline and intact.  Inferior turbinates well reduced.  No drainage from the middle meatus bilaterally.  NEUROLOGIC: No focal deficits.    ASSESSMENT/PLAN  Diagnoses and all orders for this visit:  Chronic eczematous otitis externa of both ears  -     triamcinolone (Kenalog) 0.025 % ointment; Apply topically 2 times a day as needed for irritation.  Chronic rhinitis      58 y.o. male with multiple concerns at today's visit.     1. Chronic Rhinitis, nasal obstruction, nasal septal deviation, ITH, history of nasal trauma, right maxillary sinus lesions, right chronic maxillary sinusitis  The patient reports longstanding nasal airway obstruction, worse on the left. On exam and on nasal endoscopy the patient had notable right to left nasal septal deviation with prominent left-sided spurring. There is some mild deviation of the nasal dorsum. There is also notable mucosal edema throughout the nasal cavity concerning for possible environmental allergies. Following failure of medical therapy the patient is now status post endoscopic septoplasty, right inferior turbinate reduction, left inferior turbinate out-fracturing, and limited right endoscopic sinus surgery (11/29/2022).     At the patient's last nasal endoscopy he was healing exceptionally well. Patient continues to note significant improvement in breathing since surgery. He is not currently using any nasal medications.  For now he can use saline sprays or irrigations as needed.    2. Ear itching, suspected chronic eczematoid otitis externa  The patient is also noting intermittent ear itching. He does note use of Q-tips. On exam there is some dry skin in the conchae cavum. We again discussed possible skin moisturizers/emollients but he was interested in possible  steroid cream therapy. Script provided.    Follow-up 1 years, earlier as needed.    This note was created using speech recognition transcription software. Despite proofreading, typographical errors may be present that affect the meaning of the content. Please contact my office with any questions.

## 2024-10-18 ENCOUNTER — APPOINTMENT (OUTPATIENT)
Dept: PRIMARY CARE | Facility: CLINIC | Age: 59
End: 2024-10-18
Payer: COMMERCIAL

## 2024-10-18 VITALS — BODY MASS INDEX: 39.58 KG/M2 | WEIGHT: 300 LBS | SYSTOLIC BLOOD PRESSURE: 130 MMHG | DIASTOLIC BLOOD PRESSURE: 90 MMHG

## 2024-10-18 DIAGNOSIS — I10 HYPERTENSION, UNSPECIFIED TYPE: Primary | ICD-10-CM

## 2024-10-18 PROCEDURE — 3075F SYST BP GE 130 - 139MM HG: CPT | Performed by: STUDENT IN AN ORGANIZED HEALTH CARE EDUCATION/TRAINING PROGRAM

## 2024-10-18 PROCEDURE — 3080F DIAST BP >= 90 MM HG: CPT | Performed by: STUDENT IN AN ORGANIZED HEALTH CARE EDUCATION/TRAINING PROGRAM

## 2024-10-18 PROCEDURE — 99213 OFFICE O/P EST LOW 20 MIN: CPT | Performed by: STUDENT IN AN ORGANIZED HEALTH CARE EDUCATION/TRAINING PROGRAM

## 2024-10-18 RX ORDER — SEMAGLUTIDE 2.4 MG/.75ML
2.4 INJECTION, SOLUTION SUBCUTANEOUS WEEKLY
Qty: 9 ML | Refills: 1 | Status: SHIPPED | OUTPATIENT
Start: 2024-10-18 | End: 2025-03-29

## 2024-10-18 ASSESSMENT — PATIENT HEALTH QUESTIONNAIRE - PHQ9
SUM OF ALL RESPONSES TO PHQ9 QUESTIONS 1 AND 2: 0
1. LITTLE INTEREST OR PLEASURE IN DOING THINGS: NOT AT ALL
2. FEELING DOWN, DEPRESSED OR HOPELESS: NOT AT ALL

## 2024-10-18 NOTE — PROGRESS NOTES
Subjective   Patient ID: Luther Zimmerman is a 58 y.o. male who presents for Follow-up.    HPI     Presents for follow-up medication refill.  Since last visit weight has decreased from 330 pounds to 290 pounds.  Had been eating more watermelon, since being out of season has not substituted other fruit in its place.  Bowel movements have been a little bit less frequent, about once to twice a week.  Staying very busy with work    Review of Systems    8 point review of systems is otherwise negative unless mentioned on HPI      Objective   /90   Wt 136 kg (300 lb)   BMI 39.58 kg/m²     Physical Exam    General: No acute distress  HEENT: EOMI  CV: Regular rate and rhythm, normal S1 and S2, no murmurs  Pulm: Clear to auscultation bilaterally, no wheezings, rales or rhonchi  Abd: Nondistended  MSK: 5/5 strength in all extremities  Skin: No rashes   Lymphatic: No lymphadenopathy      Assessment/Plan       Elevated BMI  -Discussed different weight loss strategies.  Encouraged activities that would allow with his right knee injury, discussed recumbent bike, elliptical or rowing.  When has been in the gym and warm and focusing on strengthening exercises and heavy weights  -Discussed different dietary modifications including focusing on low carbohydrate diet  -Continue work on cutting down and limiting pop, discussed drinking protein drinks instead.  -Wegovy 2.4mg weekly  -Mentions may need new prior authorization and number is [id number]  -To increase dietary fiber intake, bowel movements have been about once to twice a week      Hypertension  -Continue enalapril 40mg daily     SULMA  -On CPAP     Erectile dysfunction  -Viagra as needed     Healthcare maintenance  -Routine labs 11/2023     RTC 3 months     This note was dictated by speech recognition. Minor errors in transcription may be present.

## 2024-11-12 DIAGNOSIS — Z00.00 HEALTHCARE MAINTENANCE: Primary | ICD-10-CM

## 2024-11-12 RX ORDER — SILDENAFIL 100 MG/1
100 TABLET, FILM COATED ORAL DAILY PRN
Qty: 30 TABLET | Refills: 1 | Status: SHIPPED | OUTPATIENT
Start: 2024-11-12

## 2024-11-26 RX ORDER — SEMAGLUTIDE 2.4 MG/.75ML
INJECTION, SOLUTION SUBCUTANEOUS
Qty: 9 ML | Refills: 2 | Status: SHIPPED | OUTPATIENT
Start: 2024-11-26

## 2024-12-06 DIAGNOSIS — I10 HYPERTENSION, UNSPECIFIED TYPE: ICD-10-CM

## 2024-12-06 RX ORDER — ENALAPRIL MALEATE 20 MG/1
40 TABLET ORAL DAILY
Qty: 180 TABLET | Refills: 0 | Status: SHIPPED | OUTPATIENT
Start: 2024-12-06

## 2025-01-17 ENCOUNTER — APPOINTMENT (OUTPATIENT)
Dept: PRIMARY CARE | Facility: CLINIC | Age: 60
End: 2025-01-17
Payer: COMMERCIAL

## 2025-01-17 VITALS
HEIGHT: 73 IN | BODY MASS INDEX: 38.17 KG/M2 | SYSTOLIC BLOOD PRESSURE: 130 MMHG | DIASTOLIC BLOOD PRESSURE: 82 MMHG | WEIGHT: 288 LBS

## 2025-01-17 DIAGNOSIS — K59.00 CONSTIPATION, UNSPECIFIED CONSTIPATION TYPE: ICD-10-CM

## 2025-01-17 DIAGNOSIS — N20.0 KIDNEY STONES: Primary | ICD-10-CM

## 2025-01-17 DIAGNOSIS — N52.9 ERECTILE DYSFUNCTION, UNSPECIFIED ERECTILE DYSFUNCTION TYPE: ICD-10-CM

## 2025-01-17 PROCEDURE — 3008F BODY MASS INDEX DOCD: CPT | Performed by: STUDENT IN AN ORGANIZED HEALTH CARE EDUCATION/TRAINING PROGRAM

## 2025-01-17 PROCEDURE — 3075F SYST BP GE 130 - 139MM HG: CPT | Performed by: STUDENT IN AN ORGANIZED HEALTH CARE EDUCATION/TRAINING PROGRAM

## 2025-01-17 PROCEDURE — 3079F DIAST BP 80-89 MM HG: CPT | Performed by: STUDENT IN AN ORGANIZED HEALTH CARE EDUCATION/TRAINING PROGRAM

## 2025-01-17 PROCEDURE — 99213 OFFICE O/P EST LOW 20 MIN: CPT | Performed by: STUDENT IN AN ORGANIZED HEALTH CARE EDUCATION/TRAINING PROGRAM

## 2025-01-17 RX ORDER — SEMAGLUTIDE 2.4 MG/.75ML
2.4 INJECTION, SOLUTION SUBCUTANEOUS WEEKLY
Qty: 9 ML | Refills: 1 | Status: SHIPPED | OUTPATIENT
Start: 2025-01-17 | End: 2025-06-28

## 2025-01-17 RX ORDER — AMOXICILLIN 250 MG
1 CAPSULE ORAL DAILY
Qty: 90 TABLET | Refills: 1 | Status: SHIPPED | OUTPATIENT
Start: 2025-01-17 | End: 2025-07-16

## 2025-01-17 NOTE — PROGRESS NOTES
"Subjective   Patient ID: Luther Zimmerman is a 59 y.o. male who presents for Follow-up (Discus/refill med/Issues with constipation).    HPI     Presents for follow-up medication refill.  Feels like has started to plateau on weight loss.  Bowel movements have now been about once a week.  Reports that he usually does not bowel movements every day and this has been going on for a number of years.  Like referral for urology, reports has been treated for kidney stones in the past, would also like to discuss Jerry erectile dysfunction as well as some numbness that he has in his genital area with sitting    Review of Systems    8 point review of systems is otherwise negative unless mentioned on HPI      Objective   /82   Ht 1.854 m (6' 1\")   Wt 131 kg (288 lb)   BMI 38.00 kg/m²     Physical Exam    General: No acute distress  HEENT: EOMI  CV: Regular rate and rhythm, normal S1 and S2, no murmurs  Pulm: Clear to auscultation bilaterally, no wheezings, rales or rhonchi  Abd: Nondistended  MSK: 5/5 strength in all extremities  Skin: No rashes   Lymphatic: No lymphadenopathy      Assessment/Plan       Elevated BMI  -Discussed different weight loss strategies.  Encouraged activities that would allow with his right knee injury, discussed recumbent bike, elliptical or rowing.  When has been in the gym and warm and focusing on strengthening exercises and heavy weights  -Discussed different dietary modifications including focusing on low carbohydrate diet  -Continue work on cutting down and limiting pop, discussed drinking protein drinks instead.  -Wegovy 2.4mg weekly  -Mentions may need new prior authorization and number is [id number]  -To increase dietary fiber intake, bowel movements have been about once to twice a week      Hypertension  -Continue enalapril 40mg daily     SULMA  -On CPAP    History of kidney stones, erectile dysfunction  -Referral placed to urology  -Continue Viagra as needed     Healthcare " maintenance  -Routine labs 11/2023     RTC 3 months     This note was dictated by speech recognition. Minor errors in transcription may be present.

## 2025-02-12 ENCOUNTER — APPOINTMENT (OUTPATIENT)
Facility: CLINIC | Age: 60
End: 2025-02-12
Payer: COMMERCIAL

## 2025-02-12 VITALS
SYSTOLIC BLOOD PRESSURE: 141 MMHG | HEART RATE: 84 BPM | WEIGHT: 292.6 LBS | TEMPERATURE: 97 F | BODY MASS INDEX: 38.6 KG/M2 | DIASTOLIC BLOOD PRESSURE: 90 MMHG

## 2025-02-12 DIAGNOSIS — N52.9 ERECTILE DYSFUNCTION, UNSPECIFIED ERECTILE DYSFUNCTION TYPE: ICD-10-CM

## 2025-02-12 DIAGNOSIS — N20.0 KIDNEY STONES: ICD-10-CM

## 2025-02-12 DIAGNOSIS — N20.0 KIDNEY STONE: ICD-10-CM

## 2025-02-12 LAB
POC APPEARANCE, URINE: CLEAR
POC BILIRUBIN, URINE: NEGATIVE
POC BLOOD, URINE: NEGATIVE
POC COLOR, URINE: YELLOW
POC GLUCOSE, URINE: NEGATIVE MG/DL
POC KETONES, URINE: NEGATIVE MG/DL
POC LEUKOCYTES, URINE: NEGATIVE
POC NITRITE,URINE: NEGATIVE
POC PH, URINE: 7 PH
POC PROTEIN, URINE: NEGATIVE MG/DL
POC SPECIFIC GRAVITY, URINE: 1.02
POC UROBILINOGEN, URINE: 4 EU/DL

## 2025-02-12 PROCEDURE — 3077F SYST BP >= 140 MM HG: CPT | Performed by: STUDENT IN AN ORGANIZED HEALTH CARE EDUCATION/TRAINING PROGRAM

## 2025-02-12 PROCEDURE — 99203 OFFICE O/P NEW LOW 30 MIN: CPT | Performed by: STUDENT IN AN ORGANIZED HEALTH CARE EDUCATION/TRAINING PROGRAM

## 2025-02-12 PROCEDURE — 81003 URINALYSIS AUTO W/O SCOPE: CPT | Performed by: STUDENT IN AN ORGANIZED HEALTH CARE EDUCATION/TRAINING PROGRAM

## 2025-02-12 PROCEDURE — 3080F DIAST BP >= 90 MM HG: CPT | Performed by: STUDENT IN AN ORGANIZED HEALTH CARE EDUCATION/TRAINING PROGRAM

## 2025-02-12 PROCEDURE — 1036F TOBACCO NON-USER: CPT | Performed by: STUDENT IN AN ORGANIZED HEALTH CARE EDUCATION/TRAINING PROGRAM

## 2025-02-12 RX ORDER — TADALAFIL 20 MG/1
20 TABLET ORAL AS NEEDED
Qty: 30 TABLET | Refills: 6 | Status: SHIPPED | OUTPATIENT
Start: 2025-02-12

## 2025-02-12 ASSESSMENT — PATIENT HEALTH QUESTIONNAIRE - PHQ9
2. FEELING DOWN, DEPRESSED OR HOPELESS: NOT AT ALL
1. LITTLE INTEREST OR PLEASURE IN DOING THINGS: NOT AT ALL
SUM OF ALL RESPONSES TO PHQ9 QUESTIONS 1 AND 2: 0

## 2025-02-12 ASSESSMENT — ENCOUNTER SYMPTOMS
OCCASIONAL FEELINGS OF UNSTEADINESS: 0
LOSS OF SENSATION IN FEET: 0
DEPRESSION: 0

## 2025-02-12 NOTE — PROGRESS NOTES
Chief complaint:  Nephrolithiasis and Erectile Dysfunction  Referring physician:  Conor Holbrook MD     SUBJECTIVE:  HPI:  Luther Zimmerman is a 59 y.o. male with a history of HTN, T2DM, ED, nephrolithiasis who presents for initial evaluation of stones and ED.    Long history of kidney stones, had ESWL and URS LL.  Ongoing back and flank pain, unsure if related to stones or MSK.  Denies fevers.  No recent imaging available for my review.    ED progressively for past several years.  On sildenafil 100mg prn.  Has some spontaneous erectile activity, difficulty with duration with penetrative vaginal intercourse, better with oral intercourse.  Nasal congestion with sildenafil, otherwise no real side effects.  Denies history of chest pain or priapism.  No complaint of curvature.    Medical history:   has a past medical history of Cellulitis and abscess of mouth, Hypertension, Morbid obesity (Multi), Other allergy status, other than to drugs and biological substances, Personal history of other diseases of the nervous system and sense organs, Personal history of other diseases of the nervous system and sense organs, Personal history of other diseases of the respiratory system (12/22/2022), and Personal history of other endocrine, nutritional and metabolic disease.   Surgical history:   has a past surgical history that includes Other surgical history (06/16/2022); Other surgical history (06/16/2022); and Nasal septum surgery.  Family history:  family history includes Breast cancer in his mother; heart problem in his father.  Social history:   reports that he has never smoked. He has never used smokeless tobacco. He reports that he does not currently use alcohol. He reports that he does not use drugs.    Medications:    Current Outpatient Medications   Medication Instructions    enalapril (VASOTEC) 40 mg, oral, Daily    ibuprofen (Motrin) capsule if needed.    Saline NasaL 0.65 % nasal spray     semaglutide, weight loss,  "(Wegovy) 2.4 mg/0.75 mL pen injector INJECT 2.4 MG UNDER THE SKIN 1 (ONE) TIME PER WEEK FOR 12 DOSES *RENNY FRANCO*    sennosides-docusate sodium (Elizabeth-Colace) 8.6-50 mg tablet 1 tablet, oral, Daily    tadalafil (CIALIS) 20 mg, oral, As needed    triamcinolone (Kenalog) 0.025 % ointment Topical, 2 times daily PRN    Wegovy 2.4 mg, subcutaneous, Weekly      Allergies:    Allergies   Allergen Reactions    Bee Pollen Unknown    Mold Unknown    House Dust Unknown      ROS:  14-point review of systems negative except as noted above.    OBJECTIVE:  Visit Vitals  /90   Pulse 84   Temp 36.1 °C (97 °F) (Oral)   Body mass index is 38.6 kg/m².    Physical exam  General:  No acute distress  HEENT:  EOMI  CV:  Regular rate  Pulm:  Nonlabored respirations  Abd:  Soft, non-distended  :  No suprapubic or CVA tenderness  MSK:  No contractures  Neuro:  Motor intact  Psych:  Appropriate affect    Labs:    Lab Results   Component Value Date    WBC 10.1 11/02/2023    HGB 16.0 11/02/2023    HCT 50.1 11/02/2023     11/02/2023    ALT 22 11/02/2023    AST 15 11/02/2023     11/02/2023    K 4.5 11/02/2023     11/02/2023    CREATININE 1.05 11/02/2023    BUN 16 11/02/2023    CO2 26 11/02/2023    INR 1.1 11/22/2022    HGBA1C 5.6 11/02/2023   No results found for: \"URINECULTURE\"   Lab Results   Component Value Date    PSA 2.46 11/02/2023       Imaging:  All imaging discussed in HPI was independently reviewed.    ASSESSMENT:  Nephrolithiais  Erectile dysfunction due to arterial insufficiency    Discussed management options for erectile dysfunction which include:  OTC Eroxon gel - cooling topical gel to head of penis, no pharmacologic component, safe to use with other options below  Oral PDE5i with max doses of sildenafil 100mg or tadalafil 20mg PRN vs daily tadalafil 5mg for mild ED  Vacuum erection device + constriction band (must be silicone and max 30 min per use) - can be used in conjunction with any other " tx  Intracavernosal injections - most efficacious medical management, risk of priapism  MUSE intraurethral suppositories - expensive, not as efficacious as ICI, good alternative if averse or unable to use ICI  Inflatable vs malleable penile prosthesis surgery    PLAN:  Switch sildenafil to tadalafil 20mg prn  Can try Eroxon and/or constriction band  CT a/p noncon to assess current stone burden    Follow-up 3 months    Brad Joseph MD    Problem List Items Addressed This Visit    None  Visit Diagnoses       Kidney stone        Relevant Orders    POCT UA Automated manually resulted (Completed)    Kidney stones        Relevant Orders    CT abdomen pelvis wo IV contrast    Erectile dysfunction, unspecified erectile dysfunction type        Relevant Medications    tadalafil (Cialis) 20 mg tablet

## 2025-03-09 DIAGNOSIS — I10 HYPERTENSION, UNSPECIFIED TYPE: ICD-10-CM

## 2025-03-10 RX ORDER — ENALAPRIL MALEATE 20 MG/1
40 TABLET ORAL DAILY
Qty: 180 TABLET | Refills: 0 | Status: SHIPPED | OUTPATIENT
Start: 2025-03-10

## 2025-04-23 ENCOUNTER — APPOINTMENT (OUTPATIENT)
Dept: PRIMARY CARE | Facility: CLINIC | Age: 60
End: 2025-04-23
Payer: COMMERCIAL

## 2025-04-23 VITALS
DIASTOLIC BLOOD PRESSURE: 94 MMHG | SYSTOLIC BLOOD PRESSURE: 145 MMHG | OXYGEN SATURATION: 95 % | BODY MASS INDEX: 36.98 KG/M2 | WEIGHT: 279 LBS | HEIGHT: 73 IN | HEART RATE: 89 BPM

## 2025-04-23 DIAGNOSIS — N52.9 ERECTILE DYSFUNCTION, UNSPECIFIED ERECTILE DYSFUNCTION TYPE: ICD-10-CM

## 2025-04-23 DIAGNOSIS — I10 HYPERTENSION, UNSPECIFIED TYPE: ICD-10-CM

## 2025-04-23 PROCEDURE — 3077F SYST BP >= 140 MM HG: CPT | Performed by: STUDENT IN AN ORGANIZED HEALTH CARE EDUCATION/TRAINING PROGRAM

## 2025-04-23 PROCEDURE — 99213 OFFICE O/P EST LOW 20 MIN: CPT | Performed by: STUDENT IN AN ORGANIZED HEALTH CARE EDUCATION/TRAINING PROGRAM

## 2025-04-23 PROCEDURE — 3080F DIAST BP >= 90 MM HG: CPT | Performed by: STUDENT IN AN ORGANIZED HEALTH CARE EDUCATION/TRAINING PROGRAM

## 2025-04-23 PROCEDURE — 3008F BODY MASS INDEX DOCD: CPT | Performed by: STUDENT IN AN ORGANIZED HEALTH CARE EDUCATION/TRAINING PROGRAM

## 2025-04-23 PROCEDURE — 1036F TOBACCO NON-USER: CPT | Performed by: STUDENT IN AN ORGANIZED HEALTH CARE EDUCATION/TRAINING PROGRAM

## 2025-04-23 RX ORDER — TADALAFIL 20 MG/1
20 TABLET ORAL AS NEEDED
Qty: 30 TABLET | Refills: 6 | Status: SHIPPED | OUTPATIENT
Start: 2025-04-23

## 2025-04-23 ASSESSMENT — COLUMBIA-SUICIDE SEVERITY RATING SCALE - C-SSRS
2. HAVE YOU ACTUALLY HAD ANY THOUGHTS OF KILLING YOURSELF?: NO
1. IN THE PAST MONTH, HAVE YOU WISHED YOU WERE DEAD OR WISHED YOU COULD GO TO SLEEP AND NOT WAKE UP?: NO
6. HAVE YOU EVER DONE ANYTHING, STARTED TO DO ANYTHING, OR PREPARED TO DO ANYTHING TO END YOUR LIFE?: NO

## 2025-04-24 NOTE — PROGRESS NOTES
"Subjective   Patient ID: Luther Zimmerman is a 59 y.o. male who presents for Follow-up.    HPI     Presents for follow-up  Getting  in the beginning of June  Daughter recently moved in with him  Working lots of overtime    Review of Systems    8 point review of systems is otherwise negative unless mentioned on HPI      Objective   BP (!) 145/94 (BP Location: Left arm, Patient Position: Sitting, BP Cuff Size: Large adult)   Pulse 89   Ht 1.854 m (6' 1\")   Wt 127 kg (279 lb)   SpO2 95%   BMI 36.81 kg/m²     Physical Exam    General: No acute distress  HEENT: EOMI  CV: Regular rate and rhythm, normal S1 and S2, no murmurs  Pulm: Clear to auscultation bilaterally, no wheezings, rales or rhonchi  Abd: Nondistended  MSK: 5/5 strength in all extremities  Skin: No rashes   Lymphatic: No lymphadenopathy      Assessment/Plan       Elevated BMI  -Discussed different weight loss strategies.  Encouraged activities that would allow with his right knee injury, discussed recumbent bike, elliptical or rowing.  When has been in the gym and warm and focusing on strengthening exercises and heavy weights  -Discussed different dietary modifications including focusing on low carbohydrate diet  -Continue work on cutting down and limiting pop, discussed drinking protein drinks instead.  -Has been taking Wegovy 2.4mg weekly, feels like starting to plateau, will attempt to transition to Zepbound and start at 2.5mg weekly  -[employer] may need new prior authorization and number is [id number]  -To increase dietary fiber intake, bowel movements have been about once to twice a week      Hypertension  -Continue enalapril 40mg daily     SULMA  -On CPAP     History of kidney stones, erectile dysfunction  -Following with urology, planned for CT A/P     Healthcare maintenance  -Routine labs 11/2023     RTC 3 months     This note was dictated by speech recognition. Minor errors in transcription may be present.  "

## 2025-05-07 ENCOUNTER — HOSPITAL ENCOUNTER (OUTPATIENT)
Dept: RADIOLOGY | Facility: CLINIC | Age: 60
Discharge: HOME | End: 2025-05-07
Payer: COMMERCIAL

## 2025-05-07 DIAGNOSIS — N20.0 KIDNEY STONES: ICD-10-CM

## 2025-05-07 PROCEDURE — 74176 CT ABD & PELVIS W/O CONTRAST: CPT

## 2025-05-15 ENCOUNTER — APPOINTMENT (OUTPATIENT)
Facility: CLINIC | Age: 60
End: 2025-05-15
Payer: COMMERCIAL

## 2025-05-15 VITALS
DIASTOLIC BLOOD PRESSURE: 87 MMHG | BODY MASS INDEX: 39.28 KG/M2 | TEMPERATURE: 97.7 F | WEIGHT: 296.4 LBS | SYSTOLIC BLOOD PRESSURE: 137 MMHG | HEIGHT: 73 IN | HEART RATE: 91 BPM

## 2025-05-15 DIAGNOSIS — N52.01 ERECTILE DYSFUNCTION DUE TO ARTERIAL INSUFFICIENCY: Primary | ICD-10-CM

## 2025-05-15 DIAGNOSIS — N20.0 KIDNEY STONES: ICD-10-CM

## 2025-05-15 PROCEDURE — 3008F BODY MASS INDEX DOCD: CPT | Performed by: STUDENT IN AN ORGANIZED HEALTH CARE EDUCATION/TRAINING PROGRAM

## 2025-05-15 PROCEDURE — 3079F DIAST BP 80-89 MM HG: CPT | Performed by: STUDENT IN AN ORGANIZED HEALTH CARE EDUCATION/TRAINING PROGRAM

## 2025-05-15 PROCEDURE — 3075F SYST BP GE 130 - 139MM HG: CPT | Performed by: STUDENT IN AN ORGANIZED HEALTH CARE EDUCATION/TRAINING PROGRAM

## 2025-05-15 PROCEDURE — 99213 OFFICE O/P EST LOW 20 MIN: CPT | Performed by: STUDENT IN AN ORGANIZED HEALTH CARE EDUCATION/TRAINING PROGRAM

## 2025-05-15 RX ORDER — VARDENAFIL HYDROCHLORIDE 20 MG/1
20 TABLET ORAL DAILY PRN
Qty: 10 TABLET | Refills: 3 | Status: SHIPPED | OUTPATIENT
Start: 2025-05-15

## 2025-05-15 NOTE — PROGRESS NOTES
Chief complaint:  Follow-up (3 month CT results)  Referring physician:  No ref. provider found     SUBJECTIVE:  HPI:  Luther Zimmerman is a 59 y.o. male with a history of  HTN, T2DM, ED, nephrolithiasis who presents for follow up of nephrolithiasis and ED.    Doing well, no flank pain  CT reviewed - no nephrolithiasis, no hydronephrosis  Tried tadalafil 20mg, not as effective as sildenafil 100mg, but gets headache with sildenafil    Medical history:   has a past medical history of Cellulitis and abscess of mouth, Hypertension, Morbid obesity (Multi), Other allergy status, other than to drugs and biological substances, Personal history of other diseases of the nervous system and sense organs, Personal history of other diseases of the nervous system and sense organs, Personal history of other diseases of the respiratory system (12/22/2022), and Personal history of other endocrine, nutritional and metabolic disease.   Surgical history:   has a past surgical history that includes Other surgical history (06/16/2022); Other surgical history (06/16/2022); and Nasal septum surgery.  Family history:  family history includes Breast cancer in his mother; heart problem in his father.  Social history:   reports that he has never smoked. He has never used smokeless tobacco. He reports that he does not currently use alcohol. He reports that he does not use drugs.    Medications:    Current Outpatient Medications   Medication Instructions    enalapril (VASOTEC) 40 mg, oral, Daily    ibuprofen (Motrin) capsule if needed.    semaglutide, weight loss, (Wegovy) 2.4 mg/0.75 mL pen injector INJECT 2.4 MG UNDER THE SKIN 1 (ONE) TIME PER WEEK FOR 12 DOSES *PA REQ*    sennosides-docusate sodium (Elizabeth-Colace) 8.6-50 mg tablet 1 tablet, oral, Daily    tirzepatide (weight loss) (ZEPBOUND) 2.5 mg, subcutaneous, Every 7 days    triamcinolone (Kenalog) 0.025 % ointment Topical, 2 times daily PRN    vardenafil (LEVITRA) 20 mg, oral, Daily PRN     "Wegovy 2.4 mg, subcutaneous, Weekly      Allergies:    RX Allergies[1]     ROS:  14-point review of systems negative except as noted above.    OBJECTIVE:  Visit Vitals  /87   Pulse 91   Temp 36.5 °C (97.7 °F)   Body mass index is 39.11 kg/m².    Physical exam  General:  No acute distress  HEENT:  EOMI  CV:  Regular rate  Pulm:  Nonlabored respirations  Abd:  Soft, non-distended  :  No suprapubic or CVA tenderness  MSK:  No contractures  Neuro:  Motor intact  Psych:  Appropriate affect    Labs:    Lab Results   Component Value Date    WBC 10.1 11/02/2023    HGB 16.0 11/02/2023    HCT 50.1 11/02/2023     11/02/2023    ALT 22 11/02/2023    AST 15 11/02/2023     11/02/2023    K 4.5 11/02/2023     11/02/2023    CREATININE 1.05 11/02/2023    BUN 16 11/02/2023    CO2 26 11/02/2023    INR 1.1 11/22/2022    HGBA1C 5.6 11/02/2023   No results found for: \"URINECULTURE\"   Lab Results   Component Value Date    PSA 2.46 11/02/2023       Imaging:  All imaging discussed in HPI was independently reviewed.    ASSESSMENT:  Nephrolithiasis  Erectile dysfunction due to arterial insufficiency    PLAN:  Trial vardenafil 20mg prn - reviewed side effects    Follow-up with me as needed per shared decision making discussion    Brad Joseph MD    Problem List Items Addressed This Visit    None  Visit Diagnoses         Erectile dysfunction due to arterial insufficiency    -  Primary    Relevant Medications    vardenafil (Levitra) 20 mg tablet      Kidney stones                      [1]   Allergies  Allergen Reactions    Bee Pollen Unknown    Mold Unknown    House Dust Unknown     "

## 2025-06-07 DIAGNOSIS — I10 HYPERTENSION, UNSPECIFIED TYPE: ICD-10-CM

## 2025-06-09 RX ORDER — ENALAPRIL MALEATE 20 MG/1
40 TABLET ORAL DAILY
Qty: 180 TABLET | Refills: 0 | Status: SHIPPED | OUTPATIENT
Start: 2025-06-09

## 2025-06-10 ENCOUNTER — TELEPHONE (OUTPATIENT)
Dept: PRIMARY CARE | Facility: CLINIC | Age: 60
End: 2025-06-10
Payer: COMMERCIAL

## 2025-06-10 NOTE — TELEPHONE ENCOUNTER
Asking if you can up his Zepbound   Has been taking 2.5 for 2 months  The pharm told him it is hard to get the 2.5

## 2025-07-16 ENCOUNTER — APPOINTMENT (OUTPATIENT)
Dept: PRIMARY CARE | Facility: CLINIC | Age: 60
End: 2025-07-16
Payer: COMMERCIAL

## 2025-07-29 ENCOUNTER — APPOINTMENT (OUTPATIENT)
Dept: PRIMARY CARE | Facility: CLINIC | Age: 60
End: 2025-07-29
Payer: COMMERCIAL

## 2025-07-29 VITALS — DIASTOLIC BLOOD PRESSURE: 86 MMHG | SYSTOLIC BLOOD PRESSURE: 126 MMHG | BODY MASS INDEX: 38.79 KG/M2 | WEIGHT: 294 LBS

## 2025-07-29 DIAGNOSIS — K59.00 CONSTIPATION, UNSPECIFIED CONSTIPATION TYPE: ICD-10-CM

## 2025-07-29 PROCEDURE — 1036F TOBACCO NON-USER: CPT | Performed by: STUDENT IN AN ORGANIZED HEALTH CARE EDUCATION/TRAINING PROGRAM

## 2025-07-29 PROCEDURE — 3074F SYST BP LT 130 MM HG: CPT | Performed by: STUDENT IN AN ORGANIZED HEALTH CARE EDUCATION/TRAINING PROGRAM

## 2025-07-29 PROCEDURE — 99214 OFFICE O/P EST MOD 30 MIN: CPT | Performed by: STUDENT IN AN ORGANIZED HEALTH CARE EDUCATION/TRAINING PROGRAM

## 2025-07-29 PROCEDURE — 3079F DIAST BP 80-89 MM HG: CPT | Performed by: STUDENT IN AN ORGANIZED HEALTH CARE EDUCATION/TRAINING PROGRAM

## 2025-07-29 RX ORDER — AMOXICILLIN 250 MG
1 CAPSULE ORAL DAILY
Qty: 90 TABLET | Refills: 1 | Status: SHIPPED | OUTPATIENT
Start: 2025-07-29 | End: 2026-01-25

## 2025-07-29 RX ORDER — POLYETHYLENE GLYCOL 3350 17 G/17G
17 POWDER, FOR SOLUTION ORAL DAILY
Qty: 100 EACH | Refills: 1 | Status: SHIPPED | OUTPATIENT
Start: 2025-07-29 | End: 2026-02-14

## 2025-07-29 NOTE — PROGRESS NOTES
Subjective   Patient ID: Luther Zimmerman is a 59 y.o. male who presents for Follow-up and discuss meds.    HPI     Presents for follow-up.  Since last visit had switched from the highest dose of Wegovy to starting dose of Zepbound.  Reports that appetite has been a little bit higher since this time.  Weight has gone up in the interim since last visit.  Bowel movements can at times be every 3 to 4 days.    Since last visit had his first grandchild.  Reestablishing wedding date.    Review of Systems    8 point review of systems is otherwise negative unless mentioned on HPI      Objective   /86   Wt 133 kg (294 lb)   BMI 38.79 kg/m²     Physical Exam    General: No acute distress  HEENT: EOMI  CV: Regular rate and rhythm, normal S1 and S2, no murmurs  Pulm: Clear to auscultation bilaterally, no wheezings, rales or rhonchi  Abd: Nondistended  MSK: 5/5 strength in all extremities  Skin: No rashes   Lymphatic: No lymphadenopathy      Assessment/Plan       Elevated BMI  -Discussed different weight loss strategies.  Encouraged activities that would allow with his right knee injury, discussed recumbent bike, elliptical or rowing.  When has been in the gym and warm and focusing on strengthening exercises and heavy weights  -Discussed different dietary modifications including focusing on low carbohydrate diet  -Continue work on cutting down and limiting pop, discussed drinking protein drinks instead.  -Felt like weight loss had plateaued at Wegovy 2.4 mg weekly.  Has since switched to Zepbound.  Plan increase from 5 mg weekly to 7.5 mg weekly  -Mentions may need new prior authorization and number is [id number]  -To increase dietary fiber intake, bowel movements have been about once to twice a week continue senna/docusate.  Add MiraLAX     Hypertension  -Continue enalapril 40mg daily     SULMA  -On CPAP     History of kidney stones, erectile dysfunction  -Following with urology, planned for CT A/P     Healthcare  maintenance  -Routine labs 11/2023     RTC 3 months     This note was dictated by speech recognition. Minor errors in transcription may be present.

## 2025-07-31 DIAGNOSIS — I10 HYPERTENSION, UNSPECIFIED TYPE: ICD-10-CM

## 2025-07-31 RX ORDER — ENALAPRIL MALEATE 20 MG/1
40 TABLET ORAL DAILY
Qty: 180 TABLET | Refills: 0 | Status: SHIPPED | OUTPATIENT
Start: 2025-07-31

## 2025-08-01 RX ORDER — TIRZEPATIDE 7.5 MG/.5ML
7.5 INJECTION, SOLUTION SUBCUTANEOUS
Refills: 2 | OUTPATIENT
Start: 2025-08-03

## 2025-09-04 ENCOUNTER — APPOINTMENT (OUTPATIENT)
Dept: OTOLARYNGOLOGY | Facility: CLINIC | Age: 60
End: 2025-09-04
Payer: COMMERCIAL

## 2025-10-31 ENCOUNTER — APPOINTMENT (OUTPATIENT)
Dept: PRIMARY CARE | Facility: CLINIC | Age: 60
End: 2025-10-31
Payer: COMMERCIAL